# Patient Record
Sex: MALE | Race: BLACK OR AFRICAN AMERICAN | NOT HISPANIC OR LATINO | Employment: FULL TIME | URBAN - METROPOLITAN AREA
[De-identification: names, ages, dates, MRNs, and addresses within clinical notes are randomized per-mention and may not be internally consistent; named-entity substitution may affect disease eponyms.]

---

## 2017-02-27 ENCOUNTER — MYC MEDICAL ADVICE (OUTPATIENT)
Dept: INTERNAL MEDICINE | Facility: CLINIC | Age: 52
End: 2017-02-27

## 2017-02-27 DIAGNOSIS — K40.90 INGUINAL HERNIA WITHOUT OBSTRUCTION OR GANGRENE, RECURRENCE NOT SPECIFIED, UNSPECIFIED LATERALITY: Primary | ICD-10-CM

## 2017-02-28 ENCOUNTER — TELEPHONE (OUTPATIENT)
Dept: SURGERY | Facility: CLINIC | Age: 52
End: 2017-02-28

## 2017-02-28 ENCOUNTER — RADIANT APPOINTMENT (OUTPATIENT)
Dept: CT IMAGING | Facility: CLINIC | Age: 52
End: 2017-02-28
Attending: INTERNAL MEDICINE
Payer: COMMERCIAL

## 2017-02-28 DIAGNOSIS — K40.90 LEFT GROIN HERNIA: ICD-10-CM

## 2017-02-28 DIAGNOSIS — R91.8 PULMONARY NODULES: ICD-10-CM

## 2017-02-28 DIAGNOSIS — D3A.00 CARCINOID TUMOR (H): ICD-10-CM

## 2017-02-28 PROCEDURE — 74177 CT ABD & PELVIS W/CONTRAST: CPT | Performed by: RADIOLOGY

## 2017-02-28 PROCEDURE — 71260 CT THORAX DX C+: CPT | Performed by: RADIOLOGY

## 2017-02-28 RX ORDER — IOPAMIDOL 755 MG/ML
132 INJECTION, SOLUTION INTRAVASCULAR ONCE
Status: COMPLETED | OUTPATIENT
Start: 2017-02-28 | End: 2017-02-28

## 2017-02-28 RX ADMIN — IOPAMIDOL 132 ML: 755 INJECTION, SOLUTION INTRAVASCULAR at 15:46

## 2017-02-28 NOTE — TELEPHONE ENCOUNTER
Patient is calling to schedule a hernia surgery Orders are needed. I do not see where patient has been seen regarding the hernia.

## 2017-02-28 NOTE — TELEPHONE ENCOUNTER
Has never seen the provider for this so I offered to make him a appointment but he declined and said he would schedule it online. Hailey Alexander Cma

## 2017-03-01 NOTE — PROGRESS NOTES
José Miguel Alexander    Your CT looks negative.  The pulmonary nodules are likely stable and benign, but we will need to continue monitoring via imaging over time.     We can discuss in more detail at our next face to face visit.     Sincerely,     PANCHITO PUGH M.D.

## 2017-03-21 ENCOUNTER — OFFICE VISIT (OUTPATIENT)
Dept: SURGERY | Facility: CLINIC | Age: 52
End: 2017-03-21
Payer: COMMERCIAL

## 2017-03-21 VITALS
WEIGHT: 179 LBS | HEIGHT: 69 IN | HEART RATE: 65 BPM | BODY MASS INDEX: 26.51 KG/M2 | SYSTOLIC BLOOD PRESSURE: 139 MMHG | DIASTOLIC BLOOD PRESSURE: 85 MMHG

## 2017-03-21 DIAGNOSIS — K40.20 BILATERAL INGUINAL HERNIA WITHOUT OBSTRUCTION OR GANGRENE, RECURRENCE NOT SPECIFIED: Primary | ICD-10-CM

## 2017-03-21 PROCEDURE — 99244 OFF/OP CNSLTJ NEW/EST MOD 40: CPT | Performed by: SURGERY

## 2017-03-21 NOTE — PROGRESS NOTES
"Dear Toma Shahid  I was asked to see this patient by Toma Haynes for please see below.  I have seen José Miguel Alexander and as you know his chief complaint is left groin pain and will see a lump.  Worse with working out.      HPI:  Patient is a 52 year old male  with complaints see above  The patient noticed the symptoms about 3 plus years ago.    Patient has not family history of hernia problems  not lifting makes the episode better.      Review Of Systems  Respiratory: No shortness of breath, dyspnea on exertion, cough, or hemoptysis  Cardiovascular: negative  Gastrointestinal: negative  Endocrine: negative  :  negative  /85  Pulse 65  Ht 1.753 m (5' 9\")  Wt 81.2 kg (179 lb)  BMI 26.43 kg/m2    Past Medical History   Diagnosis Date     WBC decreased 9/22/2014     WBC decreased 9/22/2014       Past Surgical History   Procedure Laterality Date     Vasectomy       Hemorrhoidectomy external  10/30/2013     Procedure: HEMORRHOIDECTOMY EXTERNAL;  Excision of left Anal Mass;  Surgeon: Santiago Kessler MD;  Location: [facility] OR     Biopsy       Colon     Colonoscopy  late 2013     carcinoids removed from lower colon.     Colonoscopy N/A 12/14/2015     Procedure: COLONOSCOPY;  Surgeon: Santiago Kessler MD;  Location: [facility] OR     Colonoscopy with co2 insufflation N/A 12/14/2015     Procedure: COLONOSCOPY WITH CO2 INSUFFLATION;  Surgeon: Santiago Kessler MD;  Location: [facility] OR       Social History     Social History     Marital status:      Spouse name: N/A     Number of children: N/A     Years of education: N/A     Occupational History     Not on file.     Social History Main Topics     Smoking status: Never Smoker     Smokeless tobacco: Never Used     Alcohol use Yes      Comment: a couple of drinks a month.     Drug use: No     Sexual activity: Yes     Partners: Female     Birth control/ protection: Surgical      Comment: vasectomy     Other Topics Concern     Parent/Sibling W/ " "Cabg, Mi Or Angioplasty Before 65f 55m? No     Social History Narrative    , 3 kids.  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  Moved to [place] from [place] 2012, born and raised in [facility].         No current outpatient prescriptions on file.       10 Point review of systems all others are negative.   Above was reviewed  Physical exam: /85  Pulse 65  Ht 1.753 m (5' 9\")  Wt 81.2 kg (179 lb)  BMI 26.43 kg/m2   Patient able to get up on table without difficulty.   Patient is alert and orientated.   Head eyes, nose and mouth within normal limits.  No supraclavicular or cervical adenopathy palpated.  Thyroid within normal limits.  No carotid bruits auscultated.  Lungs are clear to auscultation  Heart is regular rate and rhythm with no murmur or thrills noted.  No costal vertebral angle tenderness noted.  Abdomen is abdomen is soft without significant tenderness, masses, organomegaly or guarding  bowel sounds are positive and no caput medusa noted.  Has bilateral inguinal hernia no inguinal hernias noted.   Testicles are normal.    Skin was warm and pink  Normal Affect    Easily palpable posterior tibial pulse or dorsalis pedis pulse bilaterally.  Lower extremity edema is not present.      Assessment: bilateral inguinal hernia    Plan to do bilateral inguinal hernia repair with mesh either the robotic or open.  Makes sense to do this with the robotic laparoscopic way.  .  Patient wants to wait for the fall.  If getting worse may need to do open but if able to wait for fall then should be able to do with robot.     Risks of surgery include damage to nerves, bleeding, infection, damage to  Vessels, recurrence.  Although mesh is a better long term repair if it gets infected it must be removed.   If the patient has any bacterial infection the week before and is seen by their doctor and started on antibiotics, I can probably still do the surgery if they are vastly improved by the time of surgery, but if the infection " starts closer to the surgery date it will be better to cancel and reschedule to a later date.  A cough will also be hard on the repair and uncomfortable post operative.  If the patient is a smoker I did discuss increase risk of recurrence and more pain with the cough.  If the patient is willing to quit smoking would encourage to do so and start at least a week before surgery.  However, if patient is not going to quit then must understand that his repair is more likely to fail.    Risks of surgery discussed including, but not limited to bleeding, infection, recurrence, damage to nerves and what is in the hernia sac.  Risks of anesthesia also discussed.    Discussed massaging hernia back in and using ice if becomes more painful.  If not able to reduce then go to emergency room.  Also discussed hernia belt to use until able to get in for surgery.    Time spent with the patient with greater that 50% of the time in discussion was 30 minutes.  In discussing the hernia and options.      Santiago Kessler MD

## 2017-03-21 NOTE — MR AVS SNAPSHOT
After Visit Summary   3/21/2017    José Miguel Alexander    MRN: [id number]           Patient Information     Date Of Birth          1965        Visit Information        Provider Department      3/21/2017 2:00 PM Santiago Kessler MD [facility] Instructions    Assessment: bilateral inguinal hernia    Plan to do bilateral inguinal hernia repair with mesh either the robotic or open.  Makes sense to do this with the robotic laparoscopic way.  .    Risks of surgery include damage to nerves, bleeding, infection, damage to  Vessels, recurrence.  Although mesh is a better long term repair if it gets infected it must be removed.   If the patient has any bacterial infection the week before and is seen by their doctor and started on antibiotics, I can probably still do the surgery if they are vastly improved by the time of surgery, but if the infection starts closer to the surgery date it will be better to cancel and reschedule to a later date.  A cough will also be hard on the repair and uncomfortable post operative.  If the patient is a smoker I did discuss increase risk of recurrence and more pain with the cough.  If the patient is willing to quit smoking would encourage to do so and start at least a week before surgery.  However, if patient is not going to quit then must understand that his repair is more likely to fail.    Risks of surgery discussed including, but not limited to bleeding, infection, recurrence, damage to nerves and what is in the hernia sac.  Risks of anesthesia also discussed.    Discussed massaging hernia back in and using ice if becomes more painful.  If not able to reduce then go to emergency room.  Also discussed hernia belt to use until able to get in for surgery.      HERNIORRAPHY DISCHARGE INSTRUCTIONS  DR. SANTIAGO KESSLER  1. You may resume your regular diet when you feel you are ready to. DO NOT drink alcoholic beverages for  24 hours of while you  are taking prescription medication.  2. Limit your activities for the first 48 hours. Gradually, increase them as tolerated. You may use stairs.  I encourage you to walk as tolerated. No lifting greater that 20 pounds for __3__ weeks.  3. You will have some discomfort at the incision sites. This is expected. This should improve over the next  2-3 days. Ice and pain medication will help with this pain. Use prescribed pain medication as instructed.  4. Bruising and mild swelling is normal after surgery. For males it is common to have bruising going into the  penis and scrotum. The area below and around the incision(s) will be hard and elevated. This is normal. I call  it the healing ridge. This will resolve slowly over the next several months. If you feel the pain is increasing  and cannot explain it by increasing activity please call us at [phone]  5. The dressing will often have some blood on it. You may shower 24 hours after surgery. Clean gently over  incision site. If clear plastic covering or steri-strip comes off and there is still some bleeding or drainage  then cover with gauze or band-aid. If no bleeding there is no reason to cover site. The abdominal binder  may be removed after 24 hours after surgery. You may continue to wear it however for comfort. I suggest  you wear an old arsalan shirt under the abdominal binder for a more comfortable wear.  6. Avoid Aspirin for the first 72 hours after the procedure. This medication may increase the tendency to bleed.  7. Use the following medications (in addition to your normal meds) as shown:  Name of Medicine Dose Frequency Reason  a. Percocet 5 mg 1-2 every 6 hours as needed for severe pain. This contains 325 mg of Tylenol (acetaminophen)  per tablet. For example, you may take 1 Percocet and 1 Tylenol, or 2 Percocet and no Tylenol,  or 2 Tylenol and no Percocet every 6 hours.  b. Tylenol (acetaminophen) 500 mg every 6 hours as needed for mild pain. Do not take  more than 1000 mg  every 6 hours. (see above)  c. Motrin (ibuprophen) 200-800 mg every 6 hours as needed for mild to moderate pain. Take with food.  d. _________________________________________________________________________  8. [facility] at [phone] if:    Your discomfort is not relieved by your pain medication    You have signs of infection such as temperature above 100.5 degrees orally, chills, or  increasing daily discomfort.    Incision site is becoming more red and/or there is purulent drainage.    You have questions or concerns  9. Please call [phone] to schedule a follow up appointment in about 2 weeks(s)  10. When taking narcotics (pain medication more than Tylenol (acetaminophen) and Motrin (ibuprophen) it is  important to keep your stools soft to avoid constipation and pain with straining. This is best done by drinking  fluids (nonalcoholic and non-caffeinated) and taking a stool softener i.e. Metamucil or milk of magnesia.  You may be able to use non-narcotics for pain relief especially by the 3rd post- operative day. [id number] mg  every 6 hours and/or Motrin (ibuprophen) 200-800 mg every 6 hours. Please do not take more than 4 grams  of Tylenol (acetaminophen) per day. Remember your Percocet does have Tylenol (acetaminophen) already  in it. If you have a history of stomach ulcers or stomach problems than do not take the Motrin (ibuprophen).  Please take Motrin (ibuprophen) with food to help protect the stomach.  11. Do not drive or operate heavy machinery for 24 hours after surgery or when taking narcotics. You may  resume driving when feel that you can safely avoid an accident and are not taking narcotics. This is usually  5 to 7 days after surgery. You should not be alone for 24 hours after surgery.    Have milk of magnesia available at home so that when you take the pain medications you take 1-2 teaspoons a day,  to help reduce problems with constipation.            "   Follow-ups after your visit        Who to contact     If you have questions or need follow up information about today's clinic visit or your schedule please contact [facility] directly at [phone].  Normal or non-critical lab and imaging results will be communicated to you by MyChart, letter or phone within 4 business days after the clinic has received the results. If you do not hear from us within 7 days, please contact the clinic through MyChart or phone. If you have a critical or abnormal lab result, we will notify you by phone as soon as possible.  Submit refill requests through [employer] or call your pharmacy and they will forward the refill request to us. Please allow 3 business days for your refill to be completed.          Additional Information About Your Visit        [email or web address] Information     [employer] gives you secure access to your electronic health record. If you see a primary care provider, you can also send messages to your care team and make appointments. If you have questions, please call your primary care clinic.  If you do not have a primary care provider, please call [phone] and they will assist you.        Care EveryWhere ID     This is your Care EveryWhere ID. This could be used by other organizations to access your [facility] medical records  [id number]        Your Vitals Were     Pulse Height BMI (Body Mass Index)             65 1.753 m (5' 9\") 26.43 kg/m2          Blood Pressure from Last 3 Encounters:   03/21/17 139/85   10/03/16 119/79   12/14/15 123/79    Weight from Last 3 Encounters:   03/21/17 81.2 kg (179 lb)   10/03/16 77.6 kg (171 lb)   10/12/15 79 kg (174 lb 3.2 oz)              Today, you had the following     No orders found for display       Primary Care Provider Office Phone # Fax #    Toma Haynes [phone]       [place] [address] [facility] [address]        Thank you!     Thank you for choosing " [facility]  for your care. Our goal is always to provide you with excellent care. Hearing back from our patients is one way we can continue to improve our services. Please take a few minutes to complete the written survey that you may receive in the mail after your visit with us. Thank you!             Your Updated Medication List - Protect others around you: Learn how to safely use, store and throw away your medicines at www.disposemymeds.org.      Notice  As of 3/21/2017  2:45 PM    You have not been prescribed any medications.

## 2017-03-21 NOTE — PATIENT INSTRUCTIONS
Assessment: bilateral inguinal hernia    Plan to do bilateral inguinal hernia repair with mesh either the robotic or open.  Makes sense to do this with the robotic laparoscopic way.  .    Risks of surgery include damage to nerves, bleeding, infection, damage to  Vessels, recurrence.  Although mesh is a better long term repair if it gets infected it must be removed.   If the patient has any bacterial infection the week before and is seen by their doctor and started on antibiotics, I can probably still do the surgery if they are vastly improved by the time of surgery, but if the infection starts closer to the surgery date it will be better to cancel and reschedule to a later date.  A cough will also be hard on the repair and uncomfortable post operative.  If the patient is a smoker I did discuss increase risk of recurrence and more pain with the cough.  If the patient is willing to quit smoking would encourage to do so and start at least a week before surgery.  However, if patient is not going to quit then must understand that his repair is more likely to fail.    Risks of surgery discussed including, but not limited to bleeding, infection, recurrence, damage to nerves and what is in the hernia sac.  Risks of anesthesia also discussed.    Discussed massaging hernia back in and using ice if becomes more painful.  If not able to reduce then go to emergency room.  Also discussed hernia belt to use until able to get in for surgery.      HERNIORRAPHY DISCHARGE INSTRUCTIONS  DR. MIRIAN CASTRO  1. You may resume your regular diet when you feel you are ready to. DO NOT drink alcoholic beverages for  24 hours of while you are taking prescription medication.  2. Limit your activities for the first 48 hours. Gradually, increase them as tolerated. You may use stairs.  I encourage you to walk as tolerated. No lifting greater that 20 pounds for __3__ weeks.  3. You will have some discomfort at the incision sites. This is  expected. This should improve over the next  2-3 days. Ice and pain medication will help with this pain. Use prescribed pain medication as instructed.  4. Bruising and mild swelling is normal after surgery. For males it is common to have bruising going into the  penis and scrotum. The area below and around the incision(s) will be hard and elevated. This is normal. I call  it the healing ridge. This will resolve slowly over the next several months. If you feel the pain is increasing  and cannot explain it by increasing activity please call us at [phone]  5. The dressing will often have some blood on it. You may shower 24 hours after surgery. Clean gently over  incision site. If clear plastic covering or steri-strip comes off and there is still some bleeding or drainage  then cover with gauze or band-aid. If no bleeding there is no reason to cover site. The abdominal binder  may be removed after 24 hours after surgery. You may continue to wear it however for comfort. I suggest  you wear an old arsalan shirt under the abdominal binder for a more comfortable wear.  6. Avoid Aspirin for the first 72 hours after the procedure. This medication may increase the tendency to bleed.  7. Use the following medications (in addition to your normal meds) as shown:  Name of Medicine Dose Frequency Reason  a. Percocet 5 mg 1-2 every 6 hours as needed for severe pain. This contains 325 mg of Tylenol (acetaminophen)  per tablet. For example, you may take 1 Percocet and 1 Tylenol, or 2 Percocet and no Tylenol,  or 2 Tylenol and no Percocet every 6 hours.  b. Tylenol (acetaminophen) 500 mg every 6 hours as needed for mild pain. Do not take more than 1000 mg  every 6 hours. (see above)  c. Motrin (ibuprophen) 200-800 mg every 6 hours as needed for mild to moderate pain. Take with food.  d. _________________________________________________________________________  8. [facility] at [phone] if:    Your discomfort  is not relieved by your pain medication    You have signs of infection such as temperature above 100.5 degrees orally, chills, or  increasing daily discomfort.    Incision site is becoming more red and/or there is purulent drainage.    You have questions or concerns  9. Please call [phone] to schedule a follow up appointment in about 2 weeks(s)  10. When taking narcotics (pain medication more than Tylenol (acetaminophen) and Motrin (ibuprophen) it is  important to keep your stools soft to avoid constipation and pain with straining. This is best done by drinking  fluids (nonalcoholic and non-caffeinated) and taking a stool softener i.e. Metamucil or milk of magnesia.  You may be able to use non-narcotics for pain relief especially by the 3rd post- operative day. [id number] mg  every 6 hours and/or Motrin (ibuprophen) 200-800 mg every 6 hours. Please do not take more than 4 grams  of Tylenol (acetaminophen) per day. Remember your Percocet does have Tylenol (acetaminophen) already  in it. If you have a history of stomach ulcers or stomach problems than do not take the Motrin (ibuprophen).  Please take Motrin (ibuprophen) with food to help protect the stomach.  11. Do not drive or operate heavy machinery for 24 hours after surgery or when taking narcotics. You may  resume driving when feel that you can safely avoid an accident and are not taking narcotics. This is usually  5 to 7 days after surgery. You should not be alone for 24 hours after surgery.    Have milk of magnesia available at home so that when you take the pain medications you take 1-2 teaspoons a day,  to help reduce problems with constipation.

## 2017-03-21 NOTE — NURSING NOTE
"Chief Complaint   Patient presents with     Hernia     LIH       Initial /85  Pulse 65  Ht 5' 9\" (1.753 m)  Wt 179 lb (81.2 kg)  BMI 26.43 kg/m2 Estimated body mass index is 26.43 kg/(m^2) as calculated from the following:    Height as of this encounter: 5' 9\" (1.753 m).    Weight as of this encounter: 179 lb (81.2 kg).  Medication Reconciliation: mathieu Alexander Cma      "

## 2020-03-02 ENCOUNTER — HEALTH MAINTENANCE LETTER (OUTPATIENT)
Age: 55
End: 2020-03-02

## 2020-12-20 ENCOUNTER — HEALTH MAINTENANCE LETTER (OUTPATIENT)
Age: 55
End: 2020-12-20

## 2021-04-18 ENCOUNTER — HEALTH MAINTENANCE LETTER (OUTPATIENT)
Age: 56
End: 2021-04-18

## 2021-10-03 ENCOUNTER — HEALTH MAINTENANCE LETTER (OUTPATIENT)
Age: 56
End: 2021-10-03

## 2022-05-15 ENCOUNTER — HEALTH MAINTENANCE LETTER (OUTPATIENT)
Age: 57
End: 2022-05-15

## 2022-09-10 ENCOUNTER — HEALTH MAINTENANCE LETTER (OUTPATIENT)
Age: 57
End: 2022-09-10

## 2023-06-03 ENCOUNTER — HEALTH MAINTENANCE LETTER (OUTPATIENT)
Age: 58
End: 2023-06-03

## 2023-07-21 NOTE — TELEPHONE ENCOUNTER
Please see MyChart message below, patient asking about referral.  RN sees referral for L groin hernia 10/10/2016 in chart - would this be the one to use or does patient need another one?    Routed to PCP.    Kelli Vidal RN  [facility]     Family